# Patient Record
Sex: MALE | Race: WHITE | NOT HISPANIC OR LATINO | URBAN - METROPOLITAN AREA
[De-identification: names, ages, dates, MRNs, and addresses within clinical notes are randomized per-mention and may not be internally consistent; named-entity substitution may affect disease eponyms.]

---

## 2022-04-07 ENCOUNTER — NEW PATIENT (OUTPATIENT)
Dept: URBAN - METROPOLITAN AREA CLINIC 9 | Facility: CLINIC | Age: 62
End: 2022-04-07

## 2022-04-07 DIAGNOSIS — H43.813: ICD-10-CM

## 2022-04-07 DIAGNOSIS — H25.13: ICD-10-CM

## 2022-04-07 DIAGNOSIS — H11.423: ICD-10-CM

## 2022-04-07 PROCEDURE — 99204 OFFICE O/P NEW MOD 45 MIN: CPT

## 2022-04-07 RX ORDER — CROMOLYN SODIUM 40 MG/ML
1 SOLUTION/ DROPS OPHTHALMIC TWICE A DAY
Start: 2022-04-07

## 2022-04-07 RX ORDER — LOTEPREDNOL ETABONATE 3.8 MG/G
1 GEL OPHTHALMIC TWICE A DAY
Start: 2022-04-07

## 2022-04-07 ASSESSMENT — VISUAL ACUITY
OD_PH: 20/30-2
OD_SC: J10
OD_CC: 20/25+1
OS_SC: 20/25
OS_CC: 20/20-1
OD_SC: 20/40-2
OS_SC: J8

## 2022-04-07 ASSESSMENT — TONOMETRY
OD_IOP_MMHG: 10
OS_IOP_MMHG: 11

## 2022-04-07 NOTE — PATIENT DISCUSSION
Patient elects to proceed with treatment option of Cromolyn BID OS and Lotemax BID OS. Sample of Lotemax given.

## 2022-04-07 NOTE — PATIENT DISCUSSION
May be related to allergies (seasonal or within home environment). May be related to sinus related swelling.

## 2022-04-07 NOTE — PATIENT DISCUSSION
Discussed treatment options. Given option of draining the lesion. However, warned recurrence is very common.

## 2022-04-07 NOTE — PATIENT DISCUSSION
Patient to start Claritin or Zyrtec daily. May try decongestion version. Start Afrin nose spray BID.

## 2022-04-21 ENCOUNTER — ESTABLISHED PATIENT (OUTPATIENT)
Dept: URBAN - METROPOLITAN AREA CLINIC 9 | Facility: CLINIC | Age: 62
End: 2022-04-21

## 2022-04-21 DIAGNOSIS — H25.13: ICD-10-CM

## 2022-04-21 DIAGNOSIS — H11.423: ICD-10-CM

## 2022-04-21 DIAGNOSIS — H11.442: ICD-10-CM

## 2022-04-21 DIAGNOSIS — H43.813: ICD-10-CM

## 2022-04-21 PROCEDURE — 68020 INCISE/DRAIN EYELID LINING: CPT

## 2022-04-21 PROCEDURE — 99214 OFFICE O/P EST MOD 30 MIN: CPT

## 2022-04-21 RX ORDER — OFLOXACIN 3 MG/ML
1 SOLUTION OPHTHALMIC TWICE A DAY
Start: 2022-04-21

## 2022-04-21 ASSESSMENT — VISUAL ACUITY
OS_SC: 20/25
OD_PH: 20/30-2
OD_CC: J1
OS_CC: J1
OD_SC: 20/40

## 2022-04-21 ASSESSMENT — TONOMETRY
OS_IOP_MMHG: 13
OD_IOP_MMHG: 14

## 2022-04-21 NOTE — PATIENT DISCUSSION
Warned the cyst may return within 24 hours. If lesion does not return, he does not need to see Dr. River Fierro.